# Patient Record
Sex: FEMALE | URBAN - METROPOLITAN AREA
[De-identification: names, ages, dates, MRNs, and addresses within clinical notes are randomized per-mention and may not be internally consistent; named-entity substitution may affect disease eponyms.]

---

## 2017-09-18 ENCOUNTER — IMPORTED ENCOUNTER (OUTPATIENT)
Dept: URBAN - METROPOLITAN AREA CLINIC 9 | Facility: CLINIC | Age: 82
End: 2017-09-18

## 2018-09-20 ENCOUNTER — IMPORTED ENCOUNTER (OUTPATIENT)
Dept: URBAN - METROPOLITAN AREA CLINIC 9 | Facility: CLINIC | Age: 83
End: 2018-09-20

## 2019-10-24 ENCOUNTER — IMPORTED ENCOUNTER (OUTPATIENT)
Dept: URBAN - METROPOLITAN AREA CLINIC 9 | Facility: CLINIC | Age: 84
End: 2019-10-24

## 2020-08-07 NOTE — PATIENT DISCUSSION
GLAUCOMA:  I have talked with the patient about my impressions, explained our treatment plan, and have answered all questions and patient understands. Continue present eye drops. Patient to follow up 6 months.

## 2020-08-07 NOTE — PATIENT DISCUSSION
CATARACT DISCUSSION: I have explained to the patient that the cataract is affecting the quality of vision and that cataract surgery may eventually be required. Will re evaluate and discuss proceedings after the first of the year.

## 2020-12-10 ENCOUNTER — IMPORTED ENCOUNTER (OUTPATIENT)
Dept: URBAN - METROPOLITAN AREA CLINIC 9 | Facility: CLINIC | Age: 85
End: 2020-12-10

## 2020-12-10 PROBLEM — H53.032: Noted: 2020-12-10

## 2020-12-10 PROBLEM — H43.813: Noted: 2020-12-10

## 2020-12-10 PROBLEM — R73.09: Noted: 2020-12-10

## 2020-12-10 PROBLEM — H25.12: Noted: 2020-12-10

## 2020-12-10 PROBLEM — H02.834: Noted: 2020-12-10

## 2020-12-10 PROBLEM — H11.153: Noted: 2020-12-10

## 2020-12-10 PROBLEM — H02.831: Noted: 2020-12-10

## 2020-12-10 PROBLEM — H43.811: Noted: 2020-12-10

## 2021-09-03 NOTE — PATIENT DISCUSSION
GLAUCOMA:  I have talked with the patient about my impressions, explained our treatment plan, and have answered all questions and patient understands. Continue present eye drops. Patient to follow up with Dr. Kelli Vaca in about 6 months. She will continue care there as this is closer for her to travel.

## 2021-10-16 ASSESSMENT — KERATOMETRY
OS_AXISANGLE_DEGREES: 47
OS_AXISANGLE2_DEGREES: 59
OS_K2POWER_DIOPTERS: 41.5
OS_K1POWER_DIOPTERS: 44
OS_AXISANGLE_DEGREES: 149
OD_K2POWER_DIOPTERS: 45.5
OS_K2POWER_DIOPTERS: 44
OD_K2POWER_DIOPTERS: 47.25
OD_AXISANGLE_DEGREES: 46
OD_AXISANGLE_DEGREES: 24
OS_AXISANGLE2_DEGREES: 137
OD_K1POWER_DIOPTERS: 43.25
OD_AXISANGLE2_DEGREES: 114
OD_AXISANGLE_DEGREES: 130
OD_AXISANGLE2_DEGREES: 136
OS_AXISANGLE2_DEGREES: 63
OS_K1POWER_DIOPTERS: 43.5
OD_K1POWER_DIOPTERS: 41.75
OD_K2POWER_DIOPTERS: 47.5
OS_AXISANGLE_DEGREES: 153
OS_K2POWER_DIOPTERS: 45.5
OS_K1POWER_DIOPTERS: 40.5
OD_K1POWER_DIOPTERS: 43.5
OD_AXISANGLE2_DEGREES: 40

## 2021-10-16 ASSESSMENT — VISUAL ACUITY
OD_CC: 20/30 -2 SN
OS_SC: 20/400 SN
OS_SC: 20/400 SN
OD_SC: 20/50 + SN
OS_SC: 20/400 SN
OD_CC: 20/50 SN
OD_CC: 20/30 -2 SN
OS_SC: 20/400 SN
OD_SC: 20/40 -2 SN
OD_CC: 20/30 -2 SN
OS_CC: 20/400 SN
OS_CC: 20/400 SN
OD_SC: 20/40 - SN
OS_CC: 20/400 SN
OD_SC: 20/40 -2 SN
OD_CC: 20/25 - SN
OS_CC: 20/400 SN

## 2021-10-16 ASSESSMENT — TONOMETRY
OD_IOP_MMHG: 12
OD_IOP_MMHG: 12
OS_IOP_MMHG: 12
OD_IOP_MMHG: 11
OD_IOP_MMHG: 12
OS_IOP_MMHG: 11
OS_IOP_MMHG: 12
OS_IOP_MMHG: 12

## 2022-07-08 RX ORDER — TIOTROPIUM BROMIDE 18 UG/1
1 CAPSULE ORAL; RESPIRATORY (INHALATION)
COMMUNITY

## 2022-07-08 RX ORDER — ALENDRONATE SODIUM 70 MG/1
TABLET ORAL
COMMUNITY

## 2022-07-08 RX ORDER — FUROSEMIDE 20 MG/1
TABLET ORAL
COMMUNITY

## 2022-07-08 RX ORDER — SIMVASTATIN 20 MG
TABLET ORAL
COMMUNITY

## 2022-07-08 RX ORDER — POTASSIUM CHLORIDE 750 MG/1
TABLET, FILM COATED, EXTENDED RELEASE ORAL
COMMUNITY

## 2022-07-08 RX ORDER — DEXLANSOPRAZOLE 60 MG/1
1 CAPSULE, DELAYED RELEASE ORAL
COMMUNITY

## 2022-07-08 RX ORDER — ALBUTEROL SULFATE 90 UG/1
AEROSOL, METERED RESPIRATORY (INHALATION)
COMMUNITY

## 2022-07-08 RX ORDER — AMLODIPINE BESYLATE 2.5 MG/1
TABLET ORAL
COMMUNITY

## 2022-07-08 RX ORDER — ASPIRIN 81 MG/1
TABLET, CHEWABLE ORAL
COMMUNITY

## 2022-09-12 ENCOUNTER — ESTABLISHED PATIENT (OUTPATIENT)
Dept: URBAN - METROPOLITAN AREA CLINIC 4 | Facility: CLINIC | Age: 87
End: 2022-09-12

## 2022-09-12 DIAGNOSIS — H02.834: ICD-10-CM

## 2022-09-12 DIAGNOSIS — H11.153: ICD-10-CM

## 2022-09-12 DIAGNOSIS — R73.09: ICD-10-CM

## 2022-09-12 DIAGNOSIS — H50.32: ICD-10-CM

## 2022-09-12 DIAGNOSIS — H43.811: ICD-10-CM

## 2022-09-12 DIAGNOSIS — H25.12: ICD-10-CM

## 2022-09-12 DIAGNOSIS — H02.831: ICD-10-CM

## 2022-09-12 DIAGNOSIS — H53.032: ICD-10-CM

## 2022-09-12 PROCEDURE — 92014 COMPRE OPH EXAM EST PT 1/>: CPT

## 2022-09-12 PROCEDURE — 92015 DETERMINE REFRACTIVE STATE: CPT

## 2022-09-12 ASSESSMENT — KERATOMETRY
OD_K2POWER_DIOPTERS: 46.5
OD_AXISANGLE_DEGREES: 50
OD_K1POWER_DIOPTERS: 44.5
OD_AXISANGLE2_DEGREES: 140

## 2022-09-12 ASSESSMENT — VISUAL ACUITY
OU_SC: 20/60+2
OS_SC: 20/400
OD_GLARE: 20/80
OD_SC: 20/60+2

## 2022-09-12 ASSESSMENT — TONOMETRY
OS_IOP_MMHG: 14
OD_IOP_MMHG: 14

## 2023-06-29 ENCOUNTER — EMERGENCY VISIT (OUTPATIENT)
Dept: URBAN - METROPOLITAN AREA CLINIC 4 | Facility: CLINIC | Age: 88
End: 2023-06-29

## 2023-06-29 DIAGNOSIS — H43.811: ICD-10-CM

## 2023-06-29 DIAGNOSIS — H11.153: ICD-10-CM

## 2023-06-29 DIAGNOSIS — H25.12: ICD-10-CM

## 2023-06-29 DIAGNOSIS — H02.834: ICD-10-CM

## 2023-06-29 DIAGNOSIS — H53.032: ICD-10-CM

## 2023-06-29 DIAGNOSIS — H02.831: ICD-10-CM

## 2023-06-29 DIAGNOSIS — R73.09: ICD-10-CM

## 2023-06-29 PROCEDURE — 92014 COMPRE OPH EXAM EST PT 1/>: CPT

## 2023-06-29 PROCEDURE — 92015 DETERMINE REFRACTIVE STATE: CPT

## 2023-06-29 ASSESSMENT — KERATOMETRY
OD_AXISANGLE2_DEGREES: 140
OD_K1POWER_DIOPTERS: 44.5
OD_K2POWER_DIOPTERS: 46.5
OD_AXISANGLE_DEGREES: 50

## 2023-06-29 ASSESSMENT — VISUAL ACUITY
OD_SC: 20/50+2
OU_SC: J1
OS_SC: 20/400

## 2023-06-29 ASSESSMENT — TONOMETRY
OS_IOP_MMHG: 12
OD_IOP_MMHG: 12

## 2023-08-04 ENCOUNTER — ESTABLISHED PATIENT (OUTPATIENT)
Dept: URBAN - METROPOLITAN AREA CLINIC 11 | Facility: CLINIC | Age: 88
End: 2023-08-04

## 2023-08-04 DIAGNOSIS — R73.09: ICD-10-CM

## 2023-08-04 DIAGNOSIS — H02.834: ICD-10-CM

## 2023-08-04 DIAGNOSIS — H02.831: ICD-10-CM

## 2023-08-04 DIAGNOSIS — H26.491: ICD-10-CM

## 2023-08-04 DIAGNOSIS — H43.813: ICD-10-CM

## 2023-08-04 PROCEDURE — 92014 COMPRE OPH EXAM EST PT 1/>: CPT

## 2023-08-04 PROCEDURE — 92201 OPSCPY EXTND RTA DRAW UNI/BI: CPT

## 2023-08-04 ASSESSMENT — KERATOMETRY
OD_K1POWER_DIOPTERS: 44.5
OD_K2POWER_DIOPTERS: 46.5
OD_AXISANGLE_DEGREES: 50
OD_AXISANGLE2_DEGREES: 140

## 2023-08-04 ASSESSMENT — VISUAL ACUITY
OD_SC: 20/100
OD_PH: 20/50
OU_SC: 20/100+2
OS_SC: CF 4FT

## 2023-08-04 ASSESSMENT — TONOMETRY
OS_IOP_MMHG: 17
OD_IOP_MMHG: 12

## 2023-08-07 ENCOUNTER — ESTABLISHED PATIENT (OUTPATIENT)
Dept: URBAN - METROPOLITAN AREA CLINIC 4 | Facility: CLINIC | Age: 88
End: 2023-08-07

## 2023-08-07 DIAGNOSIS — H50.32: ICD-10-CM

## 2023-08-07 DIAGNOSIS — H26.491: ICD-10-CM

## 2023-08-07 DIAGNOSIS — H53.032: ICD-10-CM

## 2023-08-07 PROCEDURE — 92012 INTRM OPH EXAM EST PATIENT: CPT

## 2023-08-07 ASSESSMENT — VISUAL ACUITY
OD_SC: 20/200
OD_GLARE: 20/400
OS_SC: CF 2FT

## 2023-08-07 ASSESSMENT — KERATOMETRY
OD_AXISANGLE_DEGREES: 50
OD_K1POWER_DIOPTERS: 44.5
OD_K2POWER_DIOPTERS: 46.5
OD_AXISANGLE2_DEGREES: 140

## 2023-08-07 ASSESSMENT — TONOMETRY
OD_IOP_MMHG: 161
OS_IOP_MMHG: 16

## 2024-09-12 ENCOUNTER — COMPREHENSIVE EXAM (OUTPATIENT)
Facility: LOCATION | Age: 89
End: 2024-09-12

## 2024-09-12 DIAGNOSIS — H02.834: ICD-10-CM

## 2024-09-12 DIAGNOSIS — H43.813: ICD-10-CM

## 2024-09-12 DIAGNOSIS — H53.032: ICD-10-CM

## 2024-09-12 DIAGNOSIS — H11.153: ICD-10-CM

## 2024-09-12 DIAGNOSIS — H02.831: ICD-10-CM

## 2024-09-12 DIAGNOSIS — R73.09: ICD-10-CM

## 2024-09-12 DIAGNOSIS — H26.491: ICD-10-CM

## 2024-09-12 DIAGNOSIS — H50.32: ICD-10-CM

## 2024-09-12 DIAGNOSIS — H25.12: ICD-10-CM

## 2024-09-12 PROCEDURE — 92015 DETERMINE REFRACTIVE STATE: CPT

## 2024-09-12 PROCEDURE — 92014 COMPRE OPH EXAM EST PT 1/>: CPT
